# Patient Record
Sex: MALE | Race: BLACK OR AFRICAN AMERICAN | NOT HISPANIC OR LATINO | Employment: FULL TIME | URBAN - METROPOLITAN AREA
[De-identification: names, ages, dates, MRNs, and addresses within clinical notes are randomized per-mention and may not be internally consistent; named-entity substitution may affect disease eponyms.]

---

## 2019-09-11 ENCOUNTER — APPOINTMENT (OUTPATIENT)
Dept: GENERAL RADIOLOGY | Facility: HOSPITAL | Age: 34
End: 2019-09-11

## 2019-09-11 ENCOUNTER — HOSPITAL ENCOUNTER (EMERGENCY)
Facility: HOSPITAL | Age: 34
Discharge: HOME | End: 2019-09-11
Attending: EMERGENCY MEDICINE | Admitting: EMERGENCY MEDICINE

## 2019-09-11 VITALS
OXYGEN SATURATION: 96 % | DIASTOLIC BLOOD PRESSURE: 76 MMHG | SYSTOLIC BLOOD PRESSURE: 119 MMHG | WEIGHT: 163 LBS | TEMPERATURE: 96.5 F | HEIGHT: 69 IN | HEART RATE: 68 BPM | RESPIRATION RATE: 18 BRPM | BODY MASS INDEX: 24.14 KG/M2

## 2019-09-11 DIAGNOSIS — J18.9 PNEUMONIA OF RIGHT LUNG DUE TO INFECTIOUS ORGANISM, UNSPECIFIED PART OF LUNG: Primary | ICD-10-CM

## 2019-09-11 LAB
BILIRUB UR QL STRIP: NEGATIVE
CLARITY UR: CLEAR
COLOR UR: ABNORMAL
GLUCOSE UR STRIP-MCNC: NEGATIVE MG/DL
HGB UR QL STRIP.AUTO: NEGATIVE
KETONES UR QL STRIP: ABNORMAL
LEUKOCYTE ESTERASE UR QL STRIP.AUTO: NEGATIVE
NITRITE UR QL STRIP: NEGATIVE
PH UR STRIP.AUTO: 6 [PH] (ref 5–8)
PROT UR QL STRIP: ABNORMAL
SP GR UR STRIP: >=1.03 (ref 1–1.03)
UROBILINOGEN UR QL STRIP: ABNORMAL

## 2019-09-11 PROCEDURE — 25010000002 KETOROLAC TROMETHAMINE PER 15 MG: Performed by: NURSE PRACTITIONER

## 2019-09-11 PROCEDURE — 99283 EMERGENCY DEPT VISIT LOW MDM: CPT

## 2019-09-11 PROCEDURE — 81003 URINALYSIS AUTO W/O SCOPE: CPT | Performed by: NURSE PRACTITIONER

## 2019-09-11 PROCEDURE — 71046 X-RAY EXAM CHEST 2 VIEWS: CPT

## 2019-09-11 PROCEDURE — 96372 THER/PROPH/DIAG INJ SC/IM: CPT

## 2019-09-11 RX ORDER — AMOXICILLIN AND CLAVULANATE POTASSIUM 875; 125 MG/1; MG/1
1 TABLET, FILM COATED ORAL EVERY 12 HOURS
Qty: 14 TABLET | Refills: 0 | Status: SHIPPED | OUTPATIENT
Start: 2019-09-11 | End: 2019-09-18

## 2019-09-11 RX ORDER — KETOROLAC TROMETHAMINE 30 MG/ML
30 INJECTION, SOLUTION INTRAMUSCULAR; INTRAVENOUS ONCE
Status: COMPLETED | OUTPATIENT
Start: 2019-09-11 | End: 2019-09-11

## 2019-09-11 RX ORDER — DICLOFENAC SODIUM 75 MG/1
75 TABLET, DELAYED RELEASE ORAL 2 TIMES DAILY PRN
Qty: 15 TABLET | Refills: 0 | Status: SHIPPED | OUTPATIENT
Start: 2019-09-11 | End: 2019-09-11 | Stop reason: ALTCHOICE

## 2019-09-11 RX ORDER — IBUPROFEN 800 MG/1
800 TABLET ORAL EVERY 6 HOURS PRN
COMMUNITY
End: 2020-02-17 | Stop reason: SDUPTHER

## 2019-09-11 RX ADMIN — KETOROLAC TROMETHAMINE 30 MG: 30 INJECTION, SOLUTION INTRAMUSCULAR at 03:44

## 2019-09-11 NOTE — DISCHARGE INSTRUCTIONS
Return Precautions    Although you are being discharged from the ED today, I encourage you to return for worsening symptoms.  Things can, and do, change such that treatment at home with medication may not be adequate.
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
## 2019-09-11 NOTE — ED NOTES
Patient provided discharge instructions at this time.  Respirations are even and unlabored, chest rise and fall is equal in expansion.  All patients questions answered prior to departure from the ED.  Pt ambulated from ED with steady gait, capillary refill within normal limit, speech normal.       Jackeline Jarrett RN  09/11/19 0430

## 2019-09-17 ENCOUNTER — APPOINTMENT (OUTPATIENT)
Dept: GENERAL RADIOLOGY | Facility: HOSPITAL | Age: 34
End: 2019-09-17

## 2019-09-17 ENCOUNTER — HOSPITAL ENCOUNTER (EMERGENCY)
Facility: HOSPITAL | Age: 34
Discharge: HOME | End: 2019-09-17
Attending: EMERGENCY MEDICINE | Admitting: EMERGENCY MEDICINE

## 2019-09-17 VITALS
RESPIRATION RATE: 17 BRPM | DIASTOLIC BLOOD PRESSURE: 78 MMHG | HEART RATE: 64 BPM | SYSTOLIC BLOOD PRESSURE: 125 MMHG | HEIGHT: 69 IN | BODY MASS INDEX: 24.14 KG/M2 | OXYGEN SATURATION: 100 % | TEMPERATURE: 97.1 F | WEIGHT: 163 LBS

## 2019-09-17 DIAGNOSIS — R07.89 CHEST WALL PAIN: Primary | ICD-10-CM

## 2019-09-17 DIAGNOSIS — S22.41XA CLOSED FRACTURE OF MULTIPLE RIBS OF RIGHT SIDE, INITIAL ENCOUNTER: ICD-10-CM

## 2019-09-17 DIAGNOSIS — R05.9 COUGH: ICD-10-CM

## 2019-09-17 LAB
ALBUMIN SERPL-MCNC: 4.3 G/DL (ref 3.5–5.2)
ALBUMIN/GLOB SERPL: 1.3 G/DL
ALP SERPL-CCNC: 113 U/L (ref 39–117)
ALT SERPL W P-5'-P-CCNC: 9 U/L (ref 1–41)
ANION GAP SERPL CALCULATED.3IONS-SCNC: 11.9 MMOL/L (ref 5–15)
AST SERPL-CCNC: 10 U/L (ref 1–40)
BASOPHILS # BLD AUTO: 0.03 10*3/MM3 (ref 0–0.2)
BASOPHILS NFR BLD AUTO: 0.6 % (ref 0–1.5)
BILIRUB SERPL-MCNC: 0.4 MG/DL (ref 0.2–1.2)
BUN BLD-MCNC: 6 MG/DL (ref 6–20)
BUN/CREAT SERPL: 7.3 (ref 7–25)
CALCIUM SPEC-SCNC: 9.5 MG/DL (ref 8.6–10.5)
CHLORIDE SERPL-SCNC: 96 MMOL/L (ref 98–107)
CO2 SERPL-SCNC: 27.1 MMOL/L (ref 22–29)
CREAT BLD-MCNC: 0.82 MG/DL (ref 0.76–1.27)
D-LACTATE SERPL-SCNC: 1.4 MMOL/L (ref 0.5–2)
DEPRECATED RDW RBC AUTO: 42.2 FL (ref 37–54)
EOSINOPHIL # BLD AUTO: 0.28 10*3/MM3 (ref 0–0.4)
EOSINOPHIL NFR BLD AUTO: 5.2 % (ref 0.3–6.2)
ERYTHROCYTE [DISTWIDTH] IN BLOOD BY AUTOMATED COUNT: 12.1 % (ref 12.3–15.4)
GFR SERPL CREATININE-BSD FRML MDRD: 132 ML/MIN/1.73
GLOBULIN UR ELPH-MCNC: 3.3 GM/DL
GLUCOSE BLD-MCNC: 127 MG/DL (ref 65–99)
HCT VFR BLD AUTO: 41.7 % (ref 37.5–51)
HGB BLD-MCNC: 13.6 G/DL (ref 13–17.7)
IMM GRANULOCYTES # BLD AUTO: 0.01 10*3/MM3 (ref 0–0.05)
IMM GRANULOCYTES NFR BLD AUTO: 0.2 % (ref 0–0.5)
LYMPHOCYTES # BLD AUTO: 1.34 10*3/MM3 (ref 0.7–3.1)
LYMPHOCYTES NFR BLD AUTO: 24.8 % (ref 19.6–45.3)
MCH RBC QN AUTO: 30.6 PG (ref 26.6–33)
MCHC RBC AUTO-ENTMCNC: 32.6 G/DL (ref 31.5–35.7)
MCV RBC AUTO: 93.7 FL (ref 79–97)
MONOCYTES # BLD AUTO: 0.45 10*3/MM3 (ref 0.1–0.9)
MONOCYTES NFR BLD AUTO: 8.3 % (ref 5–12)
NEUTROPHILS # BLD AUTO: 3.3 10*3/MM3 (ref 1.7–7)
NEUTROPHILS NFR BLD AUTO: 60.9 % (ref 42.7–76)
NRBC BLD AUTO-RTO: 0 /100 WBC (ref 0–0.2)
PLATELET # BLD AUTO: 281 10*3/MM3 (ref 140–450)
PMV BLD AUTO: 10.2 FL (ref 6–12)
POTASSIUM BLD-SCNC: 3.7 MMOL/L (ref 3.5–5.2)
PROCALCITONIN SERPL-MCNC: 0.02 NG/ML (ref 0.1–0.25)
PROT SERPL-MCNC: 7.6 G/DL (ref 6–8.5)
RBC # BLD AUTO: 4.45 10*6/MM3 (ref 4.14–5.8)
SODIUM BLD-SCNC: 135 MMOL/L (ref 136–145)
WBC NRBC COR # BLD: 5.41 10*3/MM3 (ref 3.4–10.8)

## 2019-09-17 PROCEDURE — 93005 ELECTROCARDIOGRAM TRACING: CPT | Performed by: NURSE PRACTITIONER

## 2019-09-17 PROCEDURE — 84145 PROCALCITONIN (PCT): CPT | Performed by: NURSE PRACTITIONER

## 2019-09-17 PROCEDURE — 85025 COMPLETE CBC W/AUTO DIFF WBC: CPT | Performed by: NURSE PRACTITIONER

## 2019-09-17 PROCEDURE — 83605 ASSAY OF LACTIC ACID: CPT | Performed by: NURSE PRACTITIONER

## 2019-09-17 PROCEDURE — 80053 COMPREHEN METABOLIC PANEL: CPT | Performed by: NURSE PRACTITIONER

## 2019-09-17 PROCEDURE — 99284 EMERGENCY DEPT VISIT MOD MDM: CPT

## 2019-09-17 PROCEDURE — 71046 X-RAY EXAM CHEST 2 VIEWS: CPT

## 2019-09-17 RX ORDER — SODIUM CHLORIDE 0.9 % (FLUSH) 0.9 %
10 SYRINGE (ML) INJECTION AS NEEDED
Status: DISCONTINUED | OUTPATIENT
Start: 2019-09-17 | End: 2019-09-17 | Stop reason: HOSPADM

## 2019-09-17 RX ORDER — HYDROCODONE BITARTRATE AND ACETAMINOPHEN 5; 325 MG/1; MG/1
1 TABLET ORAL ONCE
Status: COMPLETED | OUTPATIENT
Start: 2019-09-17 | End: 2019-09-17

## 2019-09-17 RX ORDER — ONDANSETRON 4 MG/1
4 TABLET, ORALLY DISINTEGRATING ORAL ONCE
Status: COMPLETED | OUTPATIENT
Start: 2019-09-17 | End: 2019-09-17

## 2019-09-17 RX ADMIN — HYDROCODONE BITARTRATE AND ACETAMINOPHEN 1 TABLET: 5; 325 TABLET ORAL at 16:27

## 2019-09-17 RX ADMIN — ONDANSETRON 4 MG: 4 TABLET, ORALLY DISINTEGRATING ORAL at 16:27

## 2019-09-17 NOTE — ED PROVIDER NOTES
Pt is a 34 y.o. male who presents to the ED complaining of R sided back pain that started a few days ago. He notes that the pain is worsened with deep inhalation.       On exam,  Pulmonary: Slightly decreased breath sounds on the L  BP- 115/77 HR- 81 Temp- 97.6 °F (36.4 °C) (Tympanic) O2 sat- 97%.currently      Labs and imaging reviewed.     Plan: D/w pt the imaging results.    MD ATTESTATION NOTE  The FERNANDO and I have discussed this patient's history, physical exam, and treatment plan.  I have reviewed the documentation and personally had a face to face interaction with the patient. I affirm the documentation and agree with the treatment and plan.  The attached note describes my personal findings.    Documentation assistance provided by tu Woods for Dr. Friend. Information recorded by the scribe was done at my direction and has been verified and validated by me.     Dina Woods  09/17/19 [id number]       Andres Friend MD  09/17/19 2637

## 2019-09-17 NOTE — ED NOTES
Pt complains of right mid-back pain X 1 week. Was seen here last week and diagnosed with pneumonia, has been taking abx for 1 week but still complains of back pain.     Krystal Stahl, RN  09/17/19 0778
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
## 2019-09-17 NOTE — DISCHARGE INSTRUCTIONS
Continue antibiotics  Ibuprofen for pain  Cough and deep breathe to expand the lungs regularly  Return if worse or new concerns   Continue care with your primary care physician and have your blood pressure regularly checked and managed. Normal blood pressure is 120/80.

## 2020-02-17 ENCOUNTER — APPOINTMENT (OUTPATIENT)
Dept: GENERAL RADIOLOGY | Facility: HOSPITAL | Age: 35
End: 2020-02-17

## 2020-02-17 ENCOUNTER — HOSPITAL ENCOUNTER (EMERGENCY)
Facility: HOSPITAL | Age: 35
Discharge: HOME | End: 2020-02-17
Attending: EMERGENCY MEDICINE | Admitting: EMERGENCY MEDICINE

## 2020-02-17 VITALS
WEIGHT: 165 LBS | OXYGEN SATURATION: 100 % | TEMPERATURE: 98.2 F | SYSTOLIC BLOOD PRESSURE: 132 MMHG | BODY MASS INDEX: 24.44 KG/M2 | RESPIRATION RATE: 18 BRPM | HEART RATE: 90 BPM | DIASTOLIC BLOOD PRESSURE: 81 MMHG | HEIGHT: 69 IN

## 2020-02-17 DIAGNOSIS — M25.572 ACUTE LEFT ANKLE PAIN: Primary | ICD-10-CM

## 2020-02-17 LAB
HOLD SPECIMEN: NORMAL
WHOLE BLOOD HOLD SPECIMEN: NORMAL
WHOLE BLOOD HOLD SPECIMEN: NORMAL

## 2020-02-17 PROCEDURE — 73610 X-RAY EXAM OF ANKLE: CPT

## 2020-02-17 PROCEDURE — 36415 COLL VENOUS BLD VENIPUNCTURE: CPT

## 2020-02-17 PROCEDURE — 99282 EMERGENCY DEPT VISIT SF MDM: CPT

## 2020-02-17 RX ORDER — IBUPROFEN 800 MG/1
800 TABLET ORAL EVERY 6 HOURS PRN
Qty: 28 TABLET | Refills: 0 | Status: SHIPPED | OUTPATIENT
Start: 2020-02-17 | End: 2020-02-24

## 2020-02-17 NOTE — DISCHARGE INSTRUCTIONS
Please call your previous orthopedic doctor for follow up on ankle pain. Ice and elevate ankle and avoid playing basketball until you see orthopedics.
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
## 2021-09-21 ENCOUNTER — IMMUNIZATION (OUTPATIENT)
Dept: VACCINE CLINIC | Facility: HOSPITAL | Age: 36
End: 2021-09-21

## 2021-09-21 ENCOUNTER — APPOINTMENT (OUTPATIENT)
Dept: VACCINE CLINIC | Facility: HOSPITAL | Age: 36
End: 2021-09-21

## 2021-09-21 PROCEDURE — 0001A: CPT | Performed by: INTERNAL MEDICINE

## 2021-09-21 PROCEDURE — 91300 HC SARSCOV02 VAC 30MCG/0.3ML IM: CPT | Performed by: INTERNAL MEDICINE

## 2021-10-12 ENCOUNTER — IMMUNIZATION (OUTPATIENT)
Dept: VACCINE CLINIC | Facility: HOSPITAL | Age: 36
End: 2021-10-12

## 2021-10-12 PROCEDURE — 0002A: CPT | Performed by: INTERNAL MEDICINE

## 2021-10-12 PROCEDURE — 91300 HC SARSCOV02 VAC 30MCG/0.3ML IM: CPT | Performed by: INTERNAL MEDICINE

## 2022-05-09 ENCOUNTER — TELEPHONE (OUTPATIENT)
Dept: FAMILY MEDICINE CLINIC | Facility: CLINIC | Age: 37
End: 2022-05-09

## 2022-05-09 ENCOUNTER — OFFICE VISIT (OUTPATIENT)
Dept: FAMILY MEDICINE CLINIC | Facility: CLINIC | Age: 37
End: 2022-05-09

## 2022-05-09 VITALS
OXYGEN SATURATION: 99 % | WEIGHT: 180 LBS | HEIGHT: 69 IN | BODY MASS INDEX: 26.66 KG/M2 | DIASTOLIC BLOOD PRESSURE: 68 MMHG | HEART RATE: 84 BPM | SYSTOLIC BLOOD PRESSURE: 110 MMHG

## 2022-05-09 DIAGNOSIS — Z86.004 HX OF CARCINOMA IN SITU OF COLON: ICD-10-CM

## 2022-05-09 DIAGNOSIS — Z13.1 DIABETES MELLITUS SCREENING: ICD-10-CM

## 2022-05-09 DIAGNOSIS — Z72.0 TOBACCO ABUSE: ICD-10-CM

## 2022-05-09 DIAGNOSIS — Z71.1 CONCERN ABOUT STD IN MALE WITHOUT DIAGNOSIS: ICD-10-CM

## 2022-05-09 DIAGNOSIS — D50.8 OTHER IRON DEFICIENCY ANEMIA: ICD-10-CM

## 2022-05-09 DIAGNOSIS — E78.2 MIXED HYPERLIPIDEMIA: ICD-10-CM

## 2022-05-09 DIAGNOSIS — Z00.00 ANNUAL PHYSICAL EXAM: Primary | ICD-10-CM

## 2022-05-09 DIAGNOSIS — Z45.2 ENCOUNTER FOR CARE RELATED TO VASCULAR ACCESS PORT: Primary | ICD-10-CM

## 2022-05-09 DIAGNOSIS — Z11.59 NEED FOR HEPATITIS C SCREENING TEST: ICD-10-CM

## 2022-05-09 DIAGNOSIS — R30.0 DYSURIA: ICD-10-CM

## 2022-05-09 PROBLEM — D64.9 NORMOCYTIC NORMOCHROMIC ANEMIA: Status: ACTIVE | Noted: 2020-06-29

## 2022-05-09 PROBLEM — N30.00 ACUTE CYSTITIS WITHOUT HEMATURIA: Status: ACTIVE | Noted: 2020-06-29

## 2022-05-09 PROBLEM — F41.9 ANXIETY: Status: ACTIVE | Noted: 2022-05-09

## 2022-05-09 PROBLEM — Z93.3 PRESENCE OF COLOSTOMY: Status: RESOLVED | Noted: 2022-05-09 | Resolved: 2022-05-09

## 2022-05-09 PROBLEM — R18.8 INTRAABDOMINAL FLUID COLLECTION: Status: ACTIVE | Noted: 2022-05-09

## 2022-05-09 PROBLEM — D50.9 IRON DEFICIENCY ANEMIA: Status: ACTIVE | Noted: 2021-02-04

## 2022-05-09 PROBLEM — C18.7 ADENOCARCINOMA OF SIGMOID COLON (HCC): Status: ACTIVE | Noted: 2022-05-09

## 2022-05-09 PROBLEM — Z93.3 PRESENCE OF COLOSTOMY (HCC): Status: ACTIVE | Noted: 2022-05-09

## 2022-05-09 PROBLEM — D89.0 POLYCLONAL HYPERGAMMAGLOBULINEMIA: Status: ACTIVE | Noted: 2021-02-04

## 2022-05-09 PROBLEM — Z76.89 PERSONS ENCOUNTERING HEALTH SERVICES IN OTHER SPECIFIED CIRCUMSTANCES: Status: ACTIVE | Noted: 2021-02-04

## 2022-05-09 PROBLEM — K65.1 ABSCESS OF MALE PELVIS (HCC): Status: ACTIVE | Noted: 2020-06-29

## 2022-05-09 PROBLEM — K62.5 RECTAL HEMORRHAGE: Status: ACTIVE | Noted: 2022-05-09

## 2022-05-09 PROBLEM — K52.9 INFLAMMATORY BOWEL DISEASE: Status: ACTIVE | Noted: 2020-06-29

## 2022-05-09 PROBLEM — N32.1 VESICOCOLIC FISTULA: Status: ACTIVE | Noted: 2022-05-09

## 2022-05-09 PROCEDURE — 99406 BEHAV CHNG SMOKING 3-10 MIN: CPT

## 2022-05-09 PROCEDURE — 2014F MENTAL STATUS ASSESS: CPT

## 2022-05-09 PROCEDURE — 99203 OFFICE O/P NEW LOW 30 MIN: CPT

## 2022-05-09 PROCEDURE — 3008F BODY MASS INDEX DOCD: CPT

## 2022-05-09 PROCEDURE — 99385 PREV VISIT NEW AGE 18-39: CPT

## 2022-05-09 RX ORDER — HEPARIN SODIUM (PORCINE) LOCK FLUSH IV SOLN 100 UNIT/ML 100 UNIT/ML
500 SOLUTION INTRAVENOUS AS NEEDED
OUTPATIENT
Start: 2022-05-10

## 2022-05-09 RX ORDER — SODIUM CHLORIDE 0.9 % (FLUSH) 0.9 %
20 SYRINGE (ML) INJECTION AS NEEDED
OUTPATIENT
Start: 2022-05-10

## 2022-05-09 RX ORDER — OXYCODONE AND ACETAMINOPHEN 10; 325 MG/1; MG/1
TABLET ORAL
COMMUNITY
End: 2022-05-09

## 2022-05-09 RX ORDER — HEPARIN SODIUM (PORCINE) LOCK FLUSH IV SOLN 100 UNIT/ML 100 UNIT/ML
300 SOLUTION INTRAVENOUS ONCE
OUTPATIENT
Start: 2022-05-10

## 2022-05-09 RX ORDER — SODIUM CHLORIDE 0.9 % (FLUSH) 0.9 %
10 SYRINGE (ML) INJECTION AS NEEDED
OUTPATIENT
Start: 2022-05-10

## 2022-05-09 RX ORDER — ALPRAZOLAM 0.5 MG/1
0.5 TABLET ORAL
COMMUNITY
End: 2023-02-07
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
## 2022-05-10 LAB
APPEARANCE UR: CLEAR
BACTERIA #/AREA URNS HPF: NORMAL /[HPF]
BILIRUB UR QL STRIP: NEGATIVE
CASTS URNS QL MICRO: NORMAL /LPF
COLOR UR: YELLOW
EPI CELLS #/AREA URNS HPF: NORMAL /HPF (ref 0–10)
GLUCOSE UR QL STRIP: NEGATIVE
HGB UR QL STRIP: NEGATIVE
KETONES UR QL STRIP: ABNORMAL
LEUKOCYTE ESTERASE UR QL STRIP: NEGATIVE
MICRO URNS: ABNORMAL
MICRO URNS: ABNORMAL
NITRITE UR QL STRIP: NEGATIVE
PH UR STRIP: 6.5 [PH] (ref 5–7.5)
PROT UR QL STRIP: ABNORMAL
RBC #/AREA URNS HPF: NORMAL /HPF (ref 0–2)
SP GR UR STRIP: >=1.03 (ref 1–1.03)
URINALYSIS REFLEX: ABNORMAL
UROBILINOGEN UR STRIP-MCNC: 1 MG/DL (ref 0.2–1)
WBC #/AREA URNS HPF: NORMAL /HPF (ref 0–5)

## 2022-05-11 LAB
ALBUMIN SERPL-MCNC: 4.4 G/DL (ref 4–5)
ALBUMIN/GLOB SERPL: 1.6 {RATIO} (ref 1.2–2.2)
ALP SERPL-CCNC: 85 IU/L (ref 44–121)
ALT SERPL-CCNC: 16 IU/L (ref 0–44)
AST SERPL-CCNC: 19 IU/L (ref 0–40)
BASOPHILS # BLD AUTO: 0 X10E3/UL (ref 0–0.2)
BASOPHILS NFR BLD AUTO: 1 %
BILIRUB SERPL-MCNC: 0.3 MG/DL (ref 0–1.2)
BUN SERPL-MCNC: 11 MG/DL (ref 6–20)
BUN/CREAT SERPL: 11 (ref 9–20)
C TRACH RRNA SPEC QL NAA+PROBE: NEGATIVE
CALCIUM SERPL-MCNC: 9.7 MG/DL (ref 8.7–10.2)
CHLORIDE SERPL-SCNC: 102 MMOL/L (ref 96–106)
CHOLEST SERPL-MCNC: 175 MG/DL (ref 100–199)
CO2 SERPL-SCNC: 22 MMOL/L (ref 20–29)
CREAT SERPL-MCNC: 1.01 MG/DL (ref 0.76–1.27)
EGFRCR SERPLBLD CKD-EPI 2021: 100 ML/MIN/1.73
EOSINOPHIL # BLD AUTO: 0 X10E3/UL (ref 0–0.4)
EOSINOPHIL NFR BLD AUTO: 1 %
ERYTHROCYTE [DISTWIDTH] IN BLOOD BY AUTOMATED COUNT: 12.6 % (ref 11.6–15.4)
GLOBULIN SER CALC-MCNC: 2.7 G/DL (ref 1.5–4.5)
GLUCOSE SERPL-MCNC: 67 MG/DL (ref 65–99)
HBA1C MFR BLD: 5.4 % (ref 4.8–5.6)
HCT VFR BLD AUTO: 45.5 % (ref 37.5–51)
HCV AB S/CO SERPL IA: <0.1 S/CO RATIO (ref 0–0.9)
HDLC SERPL-MCNC: 47 MG/DL
HGB BLD-MCNC: 15.5 G/DL (ref 13–17.7)
HIV 1+2 AB+HIV1 P24 AG SERPL QL IA: NON REACTIVE
IMM GRANULOCYTES # BLD AUTO: 0 X10E3/UL (ref 0–0.1)
IMM GRANULOCYTES NFR BLD AUTO: 0 %
LDLC SERPL CALC-MCNC: 113 MG/DL (ref 0–99)
LYMPHOCYTES # BLD AUTO: 1.8 X10E3/UL (ref 0.7–3.1)
LYMPHOCYTES NFR BLD AUTO: 44 %
MCH RBC QN AUTO: 33 PG (ref 26.6–33)
MCHC RBC AUTO-ENTMCNC: 34.1 G/DL (ref 31.5–35.7)
MCV RBC AUTO: 97 FL (ref 79–97)
MONOCYTES # BLD AUTO: 0.4 X10E3/UL (ref 0.1–0.9)
MONOCYTES NFR BLD AUTO: 9 %
N GONORRHOEA RRNA SPEC QL NAA+PROBE: NEGATIVE
NEUTROPHILS # BLD AUTO: 1.9 X10E3/UL (ref 1.4–7)
NEUTROPHILS NFR BLD AUTO: 45 %
PLATELET # BLD AUTO: 169 X10E3/UL (ref 150–450)
POTASSIUM SERPL-SCNC: 4.4 MMOL/L (ref 3.5–5.2)
PROT SERPL-MCNC: 7.1 G/DL (ref 6–8.5)
RBC # BLD AUTO: 4.7 X10E6/UL (ref 4.14–5.8)
RPR SER QL: NON REACTIVE
SODIUM SERPL-SCNC: 140 MMOL/L (ref 134–144)
T VAGINALIS RRNA SPEC QL NAA+PROBE: NEGATIVE
TRIGL SERPL-MCNC: 83 MG/DL (ref 0–149)
TSH SERPL DL<=0.005 MIU/L-ACNC: 0.29 UIU/ML (ref 0.45–4.5)
VIT B12 SERPL-MCNC: 550 PG/ML (ref 232–1245)
VLDLC SERPL CALC-MCNC: 15 MG/DL (ref 5–40)
WBC # BLD AUTO: 4.1 X10E3/UL (ref 3.4–10.8)

## 2022-05-14 LAB
Lab: NORMAL
T3FREE SERPL-MCNC: 3 PG/ML (ref 2–4.4)
T4 FREE SERPL-MCNC: 1.05 NG/DL (ref 0.82–1.77)
WRITTEN AUTHORIZATION: NORMAL

## 2022-05-16 DIAGNOSIS — R30.0 DYSURIA: Primary | ICD-10-CM

## 2022-05-16 NOTE — PROGRESS NOTES
Please inform pt of lab results.     STD tests Negative  Blood levels within normal limits  Kidney and liver function stable  Thyroid hormones within normal limits  Bad cholesterol mildly elevated. Work on a healthy diet and avoid bad fats such as fried foods, red meat and whole fat dairy products and add more vegetables, nuts, whole grain and increase water. Exercise routinely.     Urine very concentrated. Make sure is drinking at least 64 oz water daily and repeat urine in 1 month. Can just come in for lab.     Repeat other labs in 6 months

## 2022-06-02 ENCOUNTER — HOSPITAL ENCOUNTER (OUTPATIENT)
Dept: INFUSION THERAPY | Facility: HOSPITAL | Age: 37
Discharge: HOME | End: 2022-06-02

## 2022-12-28 ENCOUNTER — OFFICE VISIT (OUTPATIENT)
Dept: FAMILY MEDICINE CLINIC | Facility: CLINIC | Age: 37
End: 2022-12-28

## 2022-12-28 VITALS
WEIGHT: 189 LBS | OXYGEN SATURATION: 96 % | HEART RATE: 95 BPM | TEMPERATURE: 97.9 F | DIASTOLIC BLOOD PRESSURE: 62 MMHG | BODY MASS INDEX: 27.99 KG/M2 | RESPIRATION RATE: 18 BRPM | HEIGHT: 69 IN | SYSTOLIC BLOOD PRESSURE: 120 MMHG

## 2022-12-28 DIAGNOSIS — Z85.038 HISTORY OF COLON CANCER: ICD-10-CM

## 2022-12-28 DIAGNOSIS — F41.9 ANXIETY: ICD-10-CM

## 2022-12-28 DIAGNOSIS — R39.9 UTI SYMPTOMS: ICD-10-CM

## 2022-12-28 DIAGNOSIS — R10.30 LOWER ABDOMINAL PAIN: Primary | ICD-10-CM

## 2022-12-28 LAB
BILIRUB BLD-MCNC: ABNORMAL MG/DL
CLARITY, POC: ABNORMAL
COLOR UR: ABNORMAL
EXPIRATION DATE: ABNORMAL
GLUCOSE UR STRIP-MCNC: NEGATIVE MG/DL
KETONES UR QL: NEGATIVE
LEUKOCYTE EST, POC: NEGATIVE
Lab: ABNORMAL
NITRITE UR-MCNC: NEGATIVE MG/ML
PH UR: 6 [PH] (ref 5–8)
PROT UR STRIP-MCNC: ABNORMAL MG/DL
RBC # UR STRIP: ABNORMAL /UL
SP GR UR: 1.02 (ref 1–1.03)
UROBILINOGEN UR QL: ABNORMAL

## 2022-12-28 PROCEDURE — 99214 OFFICE O/P EST MOD 30 MIN: CPT

## 2022-12-28 PROCEDURE — 81003 URINALYSIS AUTO W/O SCOPE: CPT

## 2022-12-28 RX ORDER — NITROFURANTOIN 25; 75 MG/1; MG/1
100 CAPSULE ORAL 2 TIMES DAILY
Qty: 10 CAPSULE | Refills: 0 | Status: SHIPPED | OUTPATIENT
Start: 2022-12-28 | End: 2023-01-02

## 2022-12-28 RX ORDER — BUSPIRONE HYDROCHLORIDE 5 MG/1
5 TABLET ORAL 3 TIMES DAILY PRN
Qty: 90 TABLET | Refills: 0 | Status: SHIPPED | OUTPATIENT
Start: 2022-12-28 | End: 2023-02-07

## 2022-12-28 NOTE — PROGRESS NOTES
"Subjective   Tyrell Herman is a 37 y.o. male. Who presents with complaints of abd pain & urinary symptoms      History of Present Illness     abd pain x1 month- intermittent. Sharp pains in L mid and lower abdomen. Sometimes radiates to mid low back.   Dysuria, frequency, urine dark. No concerns for STD. No new partners.  Has not had changes in bowels. No blood in stool. No melena. No nausea, vomiting.   States symptoms similar to when was dx with colon ca.   Had colonoscopy 9/2/22. Had 3 mm polyp in proximal descending colon which was removed. Was told to follow up in 1 yr.     Anxiety- has been out of xanax. Was being prescribed by cancer center but since seeing yearly now they won;t fill anymore.   Gets agitated, sweating, soa, nervous. worse when goes out. Was taking xanax as needed. At least one a day.   No depression symptoms . No thoughts of self harm. Was started on xanax when diagnosed with colon ca but states had symptoms prior.   Has tried one other medication in past but cannot recall name.   Would like referral to psych but is open to try other tx     The following portions of the patient's history were reviewed and updated as appropriate: allergies, current medications, past family history, past medical history, past social history and past surgical history.    Review of Systems   Constitutional: Negative for fatigue, fever and unexpected weight loss.   Gastrointestinal: Positive for abdominal pain. Negative for anal bleeding, blood in stool, constipation, diarrhea, nausea, rectal pain and vomiting.   Genitourinary: Positive for dysuria, flank pain and frequency. Negative for decreased urine volume, difficulty urinating, discharge, genital sores, hematuria, penile pain, testicular pain and urgency.       Objective    /62   Pulse 95   Temp 97.9 °F (36.6 °C) (Temporal)   Resp 18   Ht 175.3 cm (69\")   Wt 85.7 kg (189 lb)   SpO2 96%   BMI 27.91 kg/m²     Physical Exam  Constitutional:       " Appearance: Normal appearance. He is not ill-appearing.   Pulmonary:      Effort: Pulmonary effort is normal. No respiratory distress.   Abdominal:      General: Bowel sounds are normal. There is no distension.      Palpations: There is no mass or pulsatile mass.      Tenderness: There is abdominal tenderness. There is no right CVA tenderness, left CVA tenderness, guarding or rebound.      Hernia: No hernia is present.       Musculoskeletal:        Back:    Neurological:      General: No focal deficit present.      Mental Status: He is alert and oriented to person, place, and time.      Cranial Nerves: No dysarthria.      Gait: Gait is intact.   Psychiatric:         Attention and Perception: Attention normal.         Mood and Affect: Mood normal.         Speech: Speech normal.         Behavior: Behavior normal.         Thought Content: Thought content normal.         Cognition and Memory: Cognition normal.         Judgment: Judgment normal.         Results for orders placed or performed in visit on 12/28/22   POCT urinalysis dipstick, automated    Specimen: Urine   Result Value Ref Range    Color Mónica Yellow, Straw, Dark Yellow, Mónica    Clarity, UA Slightly Cloudy (A) Clear    Specific Gravity  1.025 1.005 - 1.030    pH, Urine 6.0 5.0 - 8.0    Leukocytes Negative Negative    Nitrite, UA Negative Negative    Protein, POC Trace (A) Negative mg/dL    Glucose, UA Negative Negative mg/dL    Ketones, UA Negative Negative    Urobilinogen, UA 0.2 E.U./dL Normal, 0.2 E.U./dL    Bilirubin Moderate (2+) (A) Negative    Blood, UA Small (A) Negative    Lot Number [id number]     Expiration Date 12,212,023        Assessment & Plan   Diagnoses and all orders for this visit:    1. Lower abdominal pain (Primary)  -     POCT urinalysis dipstick, automated  -     Urine Culture - Urine, Urine, Clean Catch  -     nitrofurantoin, macrocrystal-monohydrate, (Macrobid) 100 MG capsule; Take 1 capsule by mouth 2 (Two) Times a Day for 5  days.  Dispense: 10 capsule; Refill: 0    2. History of colon cancer    3. UTI symptoms  -     nitrofurantoin, macrocrystal-monohydrate, (Macrobid) 100 MG capsule; Take 1 capsule by mouth 2 (Two) Times a Day for 5 days.  Dispense: 10 capsule; Refill: 0    -     UA + blood, dark and cloudy. No nitrates or leuk. Will go ahead and tx as symptomatic while awaiting culture.     -     Discussed concerning as hx colon ca but also reassuring recent stable colonoscopy. Will also check FOBT.   If symptoms do not improve after completing UTI tx would get imaging vs referring to GI for further eval. Pt agreeable    4. Anxiety  -     busPIRone (BUSPAR) 5 MG tablet; Take 1 tablet by mouth 3 (Three) Times a Day As Needed (anxiety).  Dispense: 90 tablet; Refill: 0  -     Ambulatory Referral to Psychiatry    -     Discussed risks of long term BNZ use and side effects and do not recommend. Has been off of this a while since has been out- recommend trying other tx to see if able to stay off BNZ. He is agreeable but would still like referral to psych.   Will start on buspar- use and common SE discussed. Would consider SSRIs in future.  Denies thoughts of self harm. If these occur seek help immediately/       Follow up : call after completing UTI tx. Or if worsening symptoms to see if improved. Will have staff call in 1 week if pt has not called.          - Pt agrees with plan of care and states no further concerns or questions today    This document is intended for medical expert use only. Reading of this document by patients and/or patient's family without participating medical staff guidance may result in misinterpretation and unintended morbidity.  Any interpretation of such data is the responsibility of the patient and/or family member responsible for the patient in concert with their primary or specialist providers, not to be left for sources of online searches such as [employer], [employer] or similar queries. Relying on these approaches  to knowledge may result in misinterpretation, misguided goals of care and even death should patients or family members try recommendations outside of the realm of professional medical care in a supervised way.     Please allow 3-5 business days for recommendations based on new results     Go to the ER for any possible lifethreatening symptoms such as chest pain or shortness of air.      I personally spent 30 minutes with this patient, preparing for the visit, reviewing tests, obtaining and/or reviewing a separately obtained history, performing a medically appropriate examination and/or evaluation , counseling and educating the patient/family/caregiver, ordering medications, tests, or procedures, documenting information in the medical record and independently interpreting results.

## 2022-12-29 ENCOUNTER — CLINICAL SUPPORT (OUTPATIENT)
Dept: FAMILY MEDICINE CLINIC | Facility: CLINIC | Age: 37
End: 2022-12-29

## 2022-12-29 DIAGNOSIS — K92.1 BLOOD IN STOOL: Primary | ICD-10-CM

## 2022-12-29 LAB
EXPIRATION DATE: NORMAL
GASTROCULT GAST QL: NEGATIVE
Lab: NORMAL

## 2022-12-29 PROCEDURE — 82274 ASSAY TEST FOR BLOOD FECAL: CPT

## 2022-12-29 NOTE — PROGRESS NOTES
Please inform pt that his stool was negative for blood which is reassuring.   Complete treatment for UTI and call if no improvement in symptoms.

## 2022-12-30 LAB
BACTERIA UR CULT: NORMAL
BACTERIA UR CULT: NORMAL
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
## 2023-02-07 ENCOUNTER — OFFICE VISIT (OUTPATIENT)
Dept: BEHAVIORAL HEALTH | Facility: CLINIC | Age: 38
End: 2023-02-07
Payer: COMMERCIAL

## 2023-02-07 VITALS
BODY MASS INDEX: 28.58 KG/M2 | SYSTOLIC BLOOD PRESSURE: 118 MMHG | OXYGEN SATURATION: 97 % | DIASTOLIC BLOOD PRESSURE: 78 MMHG | HEART RATE: 98 BPM | WEIGHT: 193 LBS | HEIGHT: 69 IN | RESPIRATION RATE: 18 BRPM

## 2023-02-07 DIAGNOSIS — F41.0 PANIC DISORDER: ICD-10-CM

## 2023-02-07 DIAGNOSIS — F41.1 GAD (GENERALIZED ANXIETY DISORDER): Primary | ICD-10-CM

## 2023-02-07 DIAGNOSIS — F32.1 CURRENT MODERATE EPISODE OF MAJOR DEPRESSIVE DISORDER WITHOUT PRIOR EPISODE: ICD-10-CM

## 2023-02-07 PROBLEM — Z85.038 HISTORY OF COLON CANCER: Status: ACTIVE | Noted: 2022-11-30

## 2023-02-07 PROCEDURE — 90792 PSYCH DIAG EVAL W/MED SRVCS: CPT

## 2023-02-07 RX ORDER — HYDROXYZINE PAMOATE 25 MG/1
CAPSULE ORAL
Qty: 60 CAPSULE | Refills: 0 | Status: SHIPPED | OUTPATIENT
Start: 2023-02-07

## 2023-02-07 RX ORDER — CITALOPRAM 10 MG/1
TABLET ORAL
Qty: 49 TABLET | Refills: 0 | Status: SHIPPED | OUTPATIENT
Start: 2023-02-07 | End: 2023-02-07

## 2023-02-07 RX ORDER — CITALOPRAM 10 MG/1
20 TABLET ORAL DAILY
Qty: 60 TABLET | Refills: 0 | Status: SHIPPED | OUTPATIENT
Start: 2023-02-07

## 2023-02-07 NOTE — PATIENT INSTRUCTIONS
Plan of Care:  Current psychiatric medications: buspar 5 mg tid. Medication Changes today: YES, stop taking BuSpar due to lack of benefit and side effects, at the same time okay to start Celexa/citalopram 10 mg take once a day for 1 week if no side effects increase to 2 tablets and continue until follow-up appointment.  Okay to start Vistaril/hydroxyzine 25 mg take 1 or 2 by mouth daily as needed for panic, do not take this medication and drive a car or do something where you can injure yourself it might make you a little sleepy at first.  Do not mix with alcohol or other sedating drugs.  Counseling recommended patient given list of local resources  Follow-up with Mikel in around 1 month virtual appointment if needed.  Reach out to Mikel in around 2 weeks for an update to see how he is doing  Consider Xanax in the future if needed, patient educated that he would have to stop using marijuana to pass a drug screen prior    General Instructions:  Patient to monitor for side effects, worsening symptoms, and/or improvement, report to PMHNP Mikel immediatlejatinder.  If a sooner appointment is needed please call office at number listed below to schedule.  Please request refills through your pharmacy prior to reaching out to office or through [facility]  Please give office staff (1) week to schedule a referral, if you have not heard anything around that time call office and ask to speak to outgoing referral .    Arron Alan   Psychiatric Mental Health Nurse Practitioner (PMHNP)  [address] [place] [address]  P: [phone]  F: [phone]    Counseling Resources:    [facility]  [address], [address]  [place] [address]  Phone: [phone]  [email or web address]    [facility] [address], [place] [address] [place] [address]   Phone: [phone]  [email or web address]    [facility], [address]  [place] [address]  [phone]  [email or web address]    [facility]  [address] [place] [address]  [phone]  [email or web address]    [address] in [place].  We occupy [address].  [phone]  [email or web address]